# Patient Record
Sex: MALE | Race: WHITE | NOT HISPANIC OR LATINO | Employment: OTHER | URBAN - NONMETROPOLITAN AREA
[De-identification: names, ages, dates, MRNs, and addresses within clinical notes are randomized per-mention and may not be internally consistent; named-entity substitution may affect disease eponyms.]

---

## 2024-01-31 ENCOUNTER — OFFICE VISIT (OUTPATIENT)
Dept: SURGERY | Facility: CLINIC | Age: 44
End: 2024-01-31
Payer: MEDICAID

## 2024-01-31 VITALS — WEIGHT: 208.2 LBS | BODY MASS INDEX: 28.2 KG/M2 | HEIGHT: 72 IN

## 2024-01-31 DIAGNOSIS — K40.90 LEFT INGUINAL HERNIA: Primary | ICD-10-CM

## 2024-01-31 PROCEDURE — 99243 OFF/OP CNSLTJ NEW/EST LOW 30: CPT | Performed by: SURGERY

## 2024-01-31 NOTE — PROGRESS NOTES
Subjective   Ronny Quezada is a 43 y.o. male is being seen for consultation today at the request of Provider, No Known    Ronny Quezada is a 43 y.o. male History of Present Illness  With left groin discomfort and bulge with activity.  This represents a hernia that is reducible with no obstructive symptoms.  No previous repairs noted.  He does not smoke.  No anticoagulation reported.  Symptoms and the masses been noted for several months.  Hernia  Pertinent negatives include no abdominal pain, chest pain, chills, coughing, fever, headaches, joint swelling, nausea, rash, sore throat, vomiting or weakness.       Past Medical History:   Diagnosis Date    Hernia, abdominal        History reviewed. No pertinent family history.    Social History     Socioeconomic History    Marital status: Unknown   Tobacco Use    Smoking status: Never     Passive exposure: Never    Smokeless tobacco: Never   Vaping Use    Vaping Use: Never used   Substance and Sexual Activity    Alcohol use: Never    Drug use: Never    Sexual activity: Defer       History reviewed. No pertinent surgical history.    Review of Systems   Constitutional:  Negative for activity change, appetite change, chills and fever.   HENT:  Negative for sore throat and trouble swallowing.    Eyes:  Negative for visual disturbance.   Respiratory:  Negative for cough and shortness of breath.    Cardiovascular:  Negative for chest pain and palpitations.   Gastrointestinal:  Negative for abdominal distention, abdominal pain, blood in stool, constipation, diarrhea, nausea and vomiting.   Endocrine: Negative for cold intolerance and heat intolerance.   Genitourinary:  Negative for dysuria.   Musculoskeletal:  Negative for joint swelling.   Skin:  Negative for color change, rash and wound.   Allergic/Immunologic: Negative for immunocompromised state.   Neurological:  Negative for dizziness, seizures, weakness and headaches.   Hematological:  Negative for adenopathy. Does not  "bruise/bleed easily.   Psychiatric/Behavioral:  Negative for agitation and confusion.          Ht 182.9 cm (72\")   Wt 94.4 kg (208 lb 3.2 oz)   BMI 28.24 kg/m²   Objective   Physical Exam  Constitutional:       Appearance: He is well-developed.   HENT:      Head: Normocephalic and atraumatic.   Eyes:      Conjunctiva/sclera: Conjunctivae normal.      Pupils: Pupils are equal, round, and reactive to light.   Neck:      Thyroid: No thyromegaly.      Vascular: No JVD.      Trachea: No tracheal deviation.   Cardiovascular:      Rate and Rhythm: Normal rate and regular rhythm.      Heart sounds: No murmur heard.     No friction rub. No gallop.   Pulmonary:      Effort: Pulmonary effort is normal.      Breath sounds: Normal breath sounds.   Abdominal:      General: There is no distension.      Palpations: Abdomen is soft. There is no hepatomegaly or splenomegaly.      Tenderness: There is no abdominal tenderness.      Hernia: No hernia is present. Hernia is present in the left inguinal area.   Musculoskeletal:         General: No deformity. Normal range of motion.      Cervical back: Neck supple.   Skin:     General: Skin is warm and dry.   Neurological:      Mental Status: He is alert and oriented to person, place, and time.               Assessment   Diagnoses and all orders for this visit:    1. Left inguinal hernia (Primary)      Ronny Quezada is a 43 y.o. male with reducible but symptomatic left inguinal hernia.  The patient will discuss financial options for repair and will call to schedule surgery via robotic versus open left inguinal hernia repair.  He is aware of the risks and benefits of both procedures.  He is aware of symptoms that should prompt return to care.                  "